# Patient Record
Sex: MALE | Race: WHITE | NOT HISPANIC OR LATINO | Employment: OTHER | URBAN - METROPOLITAN AREA
[De-identification: names, ages, dates, MRNs, and addresses within clinical notes are randomized per-mention and may not be internally consistent; named-entity substitution may affect disease eponyms.]

---

## 2023-09-01 ENCOUNTER — OFFICE VISIT (OUTPATIENT)
Dept: PRIMARY CARE | Facility: CLINIC | Age: 83
End: 2023-09-01
Payer: MEDICARE

## 2023-09-01 VITALS
SYSTOLIC BLOOD PRESSURE: 81 MMHG | BODY MASS INDEX: 28.59 KG/M2 | WEIGHT: 188 LBS | HEART RATE: 68 BPM | TEMPERATURE: 97.5 F | DIASTOLIC BLOOD PRESSURE: 44 MMHG

## 2023-09-01 DIAGNOSIS — F03.A0 MILD DEMENTIA WITHOUT BEHAVIORAL DISTURBANCE, PSYCHOTIC DISTURBANCE, MOOD DISTURBANCE, OR ANXIETY, UNSPECIFIED DEMENTIA TYPE (MULTI): ICD-10-CM

## 2023-09-01 DIAGNOSIS — K51.90 ULCERATIVE COLITIS WITHOUT COMPLICATIONS, UNSPECIFIED LOCATION (MULTI): ICD-10-CM

## 2023-09-01 DIAGNOSIS — L03.116 CELLULITIS OF LEFT LOWER EXTREMITY: ICD-10-CM

## 2023-09-01 DIAGNOSIS — Z00.00 MEDICARE ANNUAL WELLNESS VISIT, SUBSEQUENT: Primary | ICD-10-CM

## 2023-09-01 DIAGNOSIS — I73.9 PVD (PERIPHERAL VASCULAR DISEASE) (CMS-HCC): ICD-10-CM

## 2023-09-01 PROCEDURE — 1036F TOBACCO NON-USER: CPT | Performed by: FAMILY MEDICINE

## 2023-09-01 PROCEDURE — 1159F MED LIST DOCD IN RCRD: CPT | Performed by: FAMILY MEDICINE

## 2023-09-01 PROCEDURE — 1160F RVW MEDS BY RX/DR IN RCRD: CPT | Performed by: FAMILY MEDICINE

## 2023-09-01 PROCEDURE — 1170F FXNL STATUS ASSESSED: CPT | Performed by: FAMILY MEDICINE

## 2023-09-01 PROCEDURE — 3078F DIAST BP <80 MM HG: CPT | Performed by: FAMILY MEDICINE

## 2023-09-01 PROCEDURE — 3074F SYST BP LT 130 MM HG: CPT | Performed by: FAMILY MEDICINE

## 2023-09-01 PROCEDURE — 99213 OFFICE O/P EST LOW 20 MIN: CPT | Performed by: FAMILY MEDICINE

## 2023-09-01 RX ORDER — SIMVASTATIN 40 MG/1
40 TABLET, FILM COATED ORAL NIGHTLY
COMMUNITY
Start: 2012-12-07

## 2023-09-01 RX ORDER — CEPHALEXIN 500 MG/1
500 CAPSULE ORAL 2 TIMES DAILY
Qty: 20 CAPSULE | Refills: 0 | Status: SHIPPED | OUTPATIENT
Start: 2023-09-01 | End: 2023-09-11

## 2023-09-01 RX ORDER — LOSARTAN POTASSIUM 25 MG/1
25 TABLET ORAL DAILY
COMMUNITY
Start: 2021-12-10

## 2023-09-01 RX ORDER — ASPIRIN 81 MG/1
81 TABLET ORAL DAILY
COMMUNITY
Start: 2020-12-09

## 2023-09-01 RX ORDER — LEVOTHYROXINE SODIUM 100 UG/1
100 TABLET ORAL
COMMUNITY
Start: 2012-12-07

## 2023-09-01 RX ORDER — LEVOTHYROXINE SODIUM 88 UG/1
88 TABLET ORAL
COMMUNITY
Start: 2019-01-21

## 2023-09-01 RX ORDER — METOPROLOL TARTRATE 25 MG/1
25 TABLET, FILM COATED ORAL DAILY
COMMUNITY
Start: 2014-04-18

## 2023-09-01 RX ORDER — ACETAMINOPHEN 160 MG/5ML
1 SUSPENSION, ORAL (FINAL DOSE FORM) ORAL DAILY
COMMUNITY
Start: 2020-12-09

## 2023-09-01 RX ORDER — MUPIROCIN 20 MG/G
OINTMENT TOPICAL 3 TIMES DAILY
Qty: 22 G | Refills: 0 | Status: SHIPPED | OUTPATIENT
Start: 2023-09-01 | End: 2023-09-11

## 2023-09-01 RX ORDER — TAMSULOSIN HYDROCHLORIDE 0.4 MG/1
0.4 CAPSULE ORAL DAILY
COMMUNITY
Start: 2017-09-08 | End: 2024-01-30 | Stop reason: SDUPTHER

## 2023-09-01 ASSESSMENT — PATIENT HEALTH QUESTIONNAIRE - PHQ9
SUM OF ALL RESPONSES TO PHQ9 QUESTIONS 1 AND 2: 0
2. FEELING DOWN, DEPRESSED OR HOPELESS: NOT AT ALL
1. LITTLE INTEREST OR PLEASURE IN DOING THINGS: NOT AT ALL

## 2023-09-01 NOTE — PROGRESS NOTES
Subjective   Reason for Visit: Quentin Henson is an 82 y.o. male here for a Medicare Wellness visit.     Past Medical, Surgical, and Family History reviewed and updated in chart.    Reviewed all medications by prescribing practitioner or clinical pharmacist (such as prescriptions, OTCs, herbal therapies and supplements) and documented in the medical record.    Very pleasant 82-year-old with history of heart disease hyperlipidemia very mild dementia prediabetes and diet-controlled diabetes and peripheral vascular disease sees cardiologist regularly he is here for his annual Medicare wellness exam and for an infection in his leg  He had a small abrasion he remains very active in his woodworking sustained a small abrasion and has ulcer on his anterior leg  No fever or chills  Has been there for 2 weeks gradually getting worse  Was concerned it was infected did not seem to be healing well  No fever  Noticed increasing redness and warmth  Colonoscopy is up-to-date  Has history of ulcerative colitis which has been well controlled        Patient Self Assessment of Health Status  Patient Self Assessment: Good    Nutrition and Exercise  Current Diet: Well Balanced Diet  Adequate Fluid Intake: Yes  Caffeine: Yes  Exercise Frequency: Regularly    Functional Ability/Level of Safety  Cognitive Impairment Observed: No cognitive impairment observed  Cognitive Impairment Reported: Cognitive impairment reported by patient or family    Home Safety Risk Factors: None         Patient Care Team:  Rachel Woodard MD as PCP - General  Rachel Woodard MD as PCP - [employer] PCP     Review of Systems  Constitutional: no chills, no fever and no night sweats.   Eyes: no blurred vision and no eyesight problems.   ENT: no hearing loss, no nasal congestion, no nasal discharge, no hoarseness and no sore throat.   Cardiovascular: no chest pain, no intermittent leg claudication, no lower extremity edema, no palpitations and no  syncope.   Respiratory: no cough, no shortness of breath during exertion, no shortness of breath at rest and no wheezing.   Gastrointestinal: no abdominal pain, no blood in stools, no constipation, no diarrhea, no melena, no nausea, no rectal pain and no vomiting.   Genitourinary: no dysuria, no change in urinary frequency, no urinary hesitancy, no feelings of urinary urgency and no vaginal discharge.   Musculoskeletal: no arthralgias,  no back pain and no myalgias.   Integumentary: no new skin lesions and no rashes.   Neurological: no difficulty walking, no headache, no limb weakness, no numbness and no tingling.   Psychiatric: no anxiety, no depression, no anhedonia and no substance use disorders.   Endocrine: no recent weight gain and no recent weight loss.   Hematologic/Lymphatic: no tendency for easy bruising and no swollen glands .      Follow-up with  Drake thank you for letting me know  Objective     Vitals:  BP (!) 81/44   Pulse 68   Temp 36.4 °C (97.5 °F)   Wt 85.3 kg (188 lb)   BMI 28.59 kg/m²       Physical Exam  The patient appeared well nourished and normally developed. Vital signs as documented. Head exam is unremarkable. No scleral icterus or corneal arcus noted.  Pupils are equal round reactive to light extraocular movements are intact no hemorrhages noted on funduscopic exam mouth mucous membranes are moist no exudates ears canals clear TMs are gray pearly not injected nose no rhinorrhea or epistaxis Neck is without jugular venous distension, thyromegaly, or carotid bruits. Carotid upstrokes are brisk bilaterally. Lungs are clear to auscultation and percussion. Cardiac exam reveals the PMI to be normally sized and situated. Rhythm is regular. First and second heart sounds normal. No murmurs, rubs or gallops. Abdominal exam reveals normal bowel sounds, no masses, no organomegaly and no aortic enlargement. Extremities are nonedematous and both femoral and pedal pulses are normal.  Neurologic exam  DTRs are equal bilaterally no focal deficits strength is symmetrical heme lymph no palpable lymph nodes in the neck axilla or groin  Degenerative changes in the joints  Right lower extremity erythematous indurated area on the anterior tibia surrounding a small abrasion no corresponding lymphadenopathy no lymphangitic spread no streaking  Assessment/Plan   Problem List Items Addressed This Visit       Medicare annual wellness visit, subsequent - Primary    Current Assessment & Plan     Continue annual exams         Cellulitis of left lower extremity    Current Assessment & Plan     Oral cephalexin  Apply topical Bactroban  Reassess in 48 hours         PVD (peripheral vascular disease) (CMS/[facility])    Current Assessment & Plan     Stable based on symptoms and exam  Continue to monitor         Ulcerative colitis (CMS/[facility])    Current Assessment & Plan     Stable and controlled  Continue to follow with gastroenterologist         Mild dementia (CMS/[facility])    Current Assessment & Plan     Very mild short-term memory loss  Stable remains very active  Continue to monitor

## 2023-09-16 PROBLEM — K51.90 ULCERATIVE COLITIS (MULTI): Status: ACTIVE | Noted: 2017-04-04

## 2023-09-16 PROBLEM — I21.19 ACUTE INFERIOR MYOCARDIAL INFARCTION (MULTI): Status: ACTIVE | Noted: 2023-09-16

## 2023-09-16 PROBLEM — E55.9 MILD VITAMIN D DEFICIENCY: Status: ACTIVE | Noted: 2023-07-12

## 2023-09-16 PROBLEM — E78.5 HYPERLIPIDEMIA: Status: ACTIVE | Noted: 2023-09-16

## 2023-09-16 PROBLEM — Z00.00 MEDICARE ANNUAL WELLNESS VISIT, SUBSEQUENT: Status: ACTIVE | Noted: 2023-09-16

## 2023-09-16 PROBLEM — L03.116 CELLULITIS OF LEFT LOWER EXTREMITY: Status: ACTIVE | Noted: 2023-09-16

## 2023-09-16 PROBLEM — M21.079 VALGUS FOOT: Status: ACTIVE | Noted: 2023-09-16

## 2023-09-16 PROBLEM — I73.9 PVD (PERIPHERAL VASCULAR DISEASE) (CMS-HCC): Status: ACTIVE | Noted: 2023-09-16

## 2023-09-16 PROBLEM — K57.30 DIVERTICULOSIS OF LARGE INTESTINE WITHOUT HEMORRHAGE: Status: RESOLVED | Noted: 2017-04-04 | Resolved: 2023-09-16

## 2023-09-16 PROBLEM — E89.0 HYPOTHYROIDISM, POSTOP: Status: ACTIVE | Noted: 2023-07-12

## 2023-09-16 PROBLEM — R93.89 ABNORMAL CHEST XRAY: Status: ACTIVE | Noted: 2023-09-16

## 2023-09-16 PROBLEM — H26.9 BILATERAL CATARACTS: Status: RESOLVED | Noted: 2023-09-16 | Resolved: 2023-09-16

## 2023-09-16 PROBLEM — R41.82 ALTERED MENTAL STATUS: Status: RESOLVED | Noted: 2023-09-16 | Resolved: 2023-09-16

## 2023-09-16 PROBLEM — M19.90 OSTEOARTHRITIS: Status: ACTIVE | Noted: 2023-09-16

## 2023-09-16 PROBLEM — M48.061 SPINAL STENOSIS, LUMBAR REGION WITHOUT NEUROGENIC CLAUDICATION: Status: ACTIVE | Noted: 2023-09-16

## 2023-09-16 PROBLEM — I21.19 ACUTE INFERIOR MYOCARDIAL INFARCTION (MULTI): Status: RESOLVED | Noted: 2023-09-16 | Resolved: 2023-09-16

## 2023-09-16 PROBLEM — R68.89 FLU-LIKE SYMPTOMS: Status: RESOLVED | Noted: 2023-09-16 | Resolved: 2023-09-16

## 2023-09-16 PROBLEM — F03.A0 MILD DEMENTIA (MULTI): Status: ACTIVE | Noted: 2023-09-16

## 2023-09-16 PROBLEM — R31.21 ASYMPTOMATIC MICROSCOPIC HEMATURIA: Status: RESOLVED | Noted: 2023-09-16 | Resolved: 2023-09-16

## 2023-09-16 PROBLEM — M19.079 OSTEOARTHRITIS OF FOOT: Status: RESOLVED | Noted: 2023-09-16 | Resolved: 2023-09-16

## 2023-09-16 PROBLEM — I10 HYPERTENSION: Status: ACTIVE | Noted: 2023-09-16

## 2023-09-16 PROBLEM — M51.26 LUMBAR DISC HERNIATION: Status: RESOLVED | Noted: 2023-09-16 | Resolved: 2023-09-16

## 2023-09-16 PROBLEM — B35.1 DERMATOPHYTOSIS, NAIL: Status: RESOLVED | Noted: 2023-09-16 | Resolved: 2023-09-16

## 2023-09-16 PROBLEM — R73.03 PREDIABETES: Status: ACTIVE | Noted: 2023-09-16

## 2023-09-16 PROBLEM — E89.0 HYPOTHYROIDISM, POSTOP: Status: RESOLVED | Noted: 2023-07-12 | Resolved: 2023-09-16

## 2023-09-16 PROBLEM — I51.9 HEART DISEASE: Status: ACTIVE | Noted: 2023-09-16

## 2023-09-16 PROBLEM — K31.1 PYLORIC STENOSIS (HHS-HCC): Status: ACTIVE | Noted: 2023-09-16

## 2023-09-16 ASSESSMENT — PATIENT HEALTH QUESTIONNAIRE - PHQ9
2. FEELING DOWN, DEPRESSED OR HOPELESS: NOT AT ALL
SUM OF ALL RESPONSES TO PHQ9 QUESTIONS 1 AND 2: 0
1. LITTLE INTEREST OR PLEASURE IN DOING THINGS: NOT AT ALL

## 2023-09-16 ASSESSMENT — ACTIVITIES OF DAILY LIVING (ADL)
DRESSING: INDEPENDENT
TAKING_MEDICATION: INDEPENDENT
DOING_HOUSEWORK: INDEPENDENT
MANAGING_FINANCES: INDEPENDENT
GROCERY_SHOPPING: INDEPENDENT
BATHING: INDEPENDENT

## 2023-09-17 NOTE — PATIENT INSTRUCTIONS
You have an infection in your leg  I prescribed an EpiPen antibiotic both topical and oral  Follow-up if your symptoms do not improve and watch for signs of worsening infection  You also had your annual wellness exam today  Continue to follow with your cardiologist  Please call if you need anything and continue annual exams

## 2023-11-01 ENCOUNTER — OFFICE VISIT (OUTPATIENT)
Dept: PRIMARY CARE | Facility: CLINIC | Age: 83
End: 2023-11-01
Payer: MEDICARE

## 2023-11-01 VITALS
TEMPERATURE: 96.9 F | BODY MASS INDEX: 29.01 KG/M2 | DIASTOLIC BLOOD PRESSURE: 68 MMHG | SYSTOLIC BLOOD PRESSURE: 128 MMHG | HEIGHT: 68 IN | WEIGHT: 191.4 LBS | HEART RATE: 53 BPM

## 2023-11-01 DIAGNOSIS — R41.3 MEMORY LOSS: ICD-10-CM

## 2023-11-01 DIAGNOSIS — F03.A0 MILD DEMENTIA WITHOUT BEHAVIORAL DISTURBANCE, PSYCHOTIC DISTURBANCE, MOOD DISTURBANCE, OR ANXIETY, UNSPECIFIED DEMENTIA TYPE (MULTI): Primary | ICD-10-CM

## 2023-11-01 DIAGNOSIS — R41.82 ALTERED MENTAL STATUS, UNSPECIFIED ALTERED MENTAL STATUS TYPE: ICD-10-CM

## 2023-11-01 DIAGNOSIS — R73.03 PREDIABETES: ICD-10-CM

## 2023-11-01 DIAGNOSIS — E03.8 SECONDARY HYPOTHYROIDISM: ICD-10-CM

## 2023-11-01 DIAGNOSIS — E78.2 MIXED HYPERLIPIDEMIA: ICD-10-CM

## 2023-11-01 DIAGNOSIS — E55.9 MILD VITAMIN D DEFICIENCY: ICD-10-CM

## 2023-11-01 DIAGNOSIS — I10 PRIMARY HYPERTENSION: ICD-10-CM

## 2023-11-01 PROBLEM — G54.0 BRACHIAL PLEXUS NEUROPATHY: Status: RESOLVED | Noted: 2020-02-01 | Resolved: 2023-11-01

## 2023-11-01 LAB
NON-UH HIE A/G RATIO: 1.3
NON-UH HIE ALB: 4 G/DL (ref 3.4–5)
NON-UH HIE ALK PHOS: 69 UNIT/L (ref 45–117)
NON-UH HIE BILIRUBIN, TOTAL: 0.6 MG/DL (ref 0.3–1.2)
NON-UH HIE BUN/CREAT RATIO: 25.5
NON-UH HIE BUN: 28 MG/DL (ref 9–23)
NON-UH HIE CALCIUM: 9.5 MG/DL (ref 8.7–10.4)
NON-UH HIE CALCULATED LDL CHOLESTEROL: 69 MG/DL (ref 60–130)
NON-UH HIE CALCULATED OSMOLALITY: 288 MOSM/KG (ref 275–295)
NON-UH HIE CHLORIDE: 105 MMOL/L (ref 98–107)
NON-UH HIE CHOLESTEROL: 140 MG/DL (ref 100–200)
NON-UH HIE CO2, VENOUS: 29 MMOL/L (ref 20–31)
NON-UH HIE CREATININE: 1.1 MG/DL (ref 0.6–1.1)
NON-UH HIE GFR AA: >60
NON-UH HIE GLOBULIN: 3 G/DL
NON-UH HIE GLOMERULAR FILTRATION RATE: >60 ML/MIN/1.73M?
NON-UH HIE GLUCOSE: 126 MG/DL (ref 74–106)
NON-UH HIE GOT: 24 UNIT/L (ref 15–37)
NON-UH HIE GPT: 30 UNIT/L (ref 10–49)
NON-UH HIE HDL CHOLESTEROL: 60 MG/DL (ref 40–60)
NON-UH HIE HGB A1C: 6 %
NON-UH HIE K: 4.4 MMOL/L (ref 3.5–5.1)
NON-UH HIE NA: 141 MMOL/L (ref 135–145)
NON-UH HIE TOTAL CHOL/HDL CHOL RATIO: 2.3
NON-UH HIE TOTAL PROTEIN: 7 G/DL (ref 5.7–8.2)
NON-UH HIE TRIGLYCERIDES: 57 MG/DL (ref 30–150)
NON-UH HIE TSH: 7.62 UIU/ML (ref 0.55–4.78)
NON-UH HIE VIT D 25: 54 NG/ML
NON-UH HIE VITAMIN B12: 362 PG/ML (ref 211–911)

## 2023-11-01 PROCEDURE — 1159F MED LIST DOCD IN RCRD: CPT | Performed by: FAMILY MEDICINE

## 2023-11-01 PROCEDURE — 3078F DIAST BP <80 MM HG: CPT | Performed by: FAMILY MEDICINE

## 2023-11-01 PROCEDURE — 1036F TOBACCO NON-USER: CPT | Performed by: FAMILY MEDICINE

## 2023-11-01 PROCEDURE — 1160F RVW MEDS BY RX/DR IN RCRD: CPT | Performed by: FAMILY MEDICINE

## 2023-11-01 PROCEDURE — 3074F SYST BP LT 130 MM HG: CPT | Performed by: FAMILY MEDICINE

## 2023-11-01 PROCEDURE — 99214 OFFICE O/P EST MOD 30 MIN: CPT | Performed by: FAMILY MEDICINE

## 2023-11-01 NOTE — PROGRESS NOTES
Subjective   Patient ID: Quentin Henson is a 83 y.o. male who presents for Follow-up (Wants to discuss having another CT scan. Defers flu shot today. ).  Very pleasant 83-year-old with history of hypertension and hyperlipidemia sees cardiology regularly  Also has history of prostate disease sees urologist  He had a recent episode of cellulitis in the lower extremity and that got better he is here today for follow-up  Extensive discussion with patient and his wife reports that he has has memory issues he does not notice that himself has not until fairly recently he occasionally gets forgetful he does not get lost he drives he has been eating healthy he does not get lost when he drives he is very routine oriented and is staying he thinks that his wife focuses on that too much and his habits and he is frequently quiet and does not feel that his mental status is all that bad she is adamant that he has dementia he is now concerned because he is getting occasionally forgetful not remembering where he placed his keys and things like that but does not get lost when he drives he remembers how to use appliances at home and is able to balance his checkbook he does not report any depression he is concerned because his brother is going into a nursing home for memory issues and his sister is in a nursing home as well for severe memory loss  He otherwise feels well he keeps up with doctors appointments sees all his specialists he is due for his blood work he has a history of prediabetes for diet-controlled type 2 diabetes he is felt well no hypo or her hyperglycemia no ketoacidosis        Review of Systems  Constitutional: no chills, no fever and no night sweats.   Eyes: no blurred vision and no eyesight problems.   ENT: no hearing loss, no nasal congestion, no nasal discharge, no hoarseness and no sore throat.   Cardiovascular: no chest pain, no intermittent leg claudication, no lower extremity edema, no palpitations and no  "syncope.   Respiratory: no cough, no shortness of breath during exertion, no shortness of breath at rest and no wheezing.   Gastrointestinal: no abdominal pain, no blood in stools, no constipation, no diarrhea, no melena, no nausea, no rectal pain and no vomiting.   Genitourinary: no dysuria, no change in urinary frequency, no urinary hesitancy, no feelings of urinary urgency and no vaginal discharge.   Musculoskeletal: no arthralgias,  no back pain and no myalgias.   Integumentary: no new skin lesions and no rashes.   Neurological: no difficulty walking, no headache, no limb weakness, no numbness and no tingling.   Psychiatric: no anxiety, no depression, no anhedonia and no substance use disorders.   Endocrine: no recent weight gain and no recent weight loss.   Hematologic/Lymphatic: no tendency for easy bruising and no swollen glands .    Objective    /68   Pulse 53   Temp 36.1 °C (96.9 °F)   Ht 1.727 m (5' 8\")   Wt 86.8 kg (191 lb 6.4 oz)   BMI 29.10 kg/m²    Physical Exam  The patient appeared well nourished and normally developed. Vital signs as documented. Head exam is unremarkable. No scleral icterus or corneal arcus noted.  Pupils are equal round reactive to light extraocular movements are intact no hemorrhages noted on funduscopic exam mouth mucous membranes are moist no exudates ears canals clear TMs are gray pearly not injected nose no rhinorrhea or epistaxis Neck is without jugular venous distension, thyromegaly, or carotid bruits. Carotid upstrokes are brisk bilaterally. Lungs are clear to auscultation and percussion. Cardiac exam reveals the PMI to be normally sized and situated. Rhythm is regular. First and second heart sounds normal. No murmurs, rubs or gallops. Abdominal exam reveals normal bowel sounds, no masses, no organomegaly and no aortic enlargement. Extremities are nonedematous and both femoral and pedal pulses are normal.  Neurologic exam DTRs are equal bilaterally no focal " deficits strength is symmetrical heme lymph no palpable lymph nodes in the neck axilla or groin degenerative changes in the joints alert no acute distress  Assessment/Plan   Problem List Items Addressed This Visit       Altered mental status     Patient notes worsening mental function particularly short-term memory  Consult neuropsychology  CT of the head         Relevant Orders    CT head wo IV contrast    Hypertension    Relevant Orders    Comprehensive Metabolic Panel    Hyperlipidemia    Relevant Orders    Lipid Panel    Mild vitamin D deficiency    Relevant Orders    Vitamin D 25-Hydroxy,Total (for eval of Vitamin D levels)    Secondary hypothyroidism    Relevant Orders    TSH    Mild dementia (CMS/HCC) - Primary    Relevant Orders    Vitamin B12    Prediabetes    Relevant Orders    Hemoglobin A1C     Other Visit Diagnoses       Memory loss                     Rachel Woodard MD

## 2023-11-13 NOTE — RESULT ENCOUNTER NOTE
Call patient  His thyroid is underactive  I believe he has an endocrinologist he may want to follow-up  His blood sugar is high as well his vitamin levels are normal and his cholesterol levels are great

## 2023-11-18 PROBLEM — L03.116 CELLULITIS OF LEFT LOWER EXTREMITY: Status: RESOLVED | Noted: 2023-09-16 | Resolved: 2023-11-18

## 2023-11-18 PROBLEM — R41.82 ALTERED MENTAL STATUS: Status: ACTIVE | Noted: 2023-09-16

## 2023-11-19 NOTE — ASSESSMENT & PLAN NOTE
Patient notes worsening mental function particularly short-term memory  Consult neuropsychology  CT of the head

## 2023-12-26 ENCOUNTER — TELEPHONE (OUTPATIENT)
Dept: PRIMARY CARE | Facility: CLINIC | Age: 83
End: 2023-12-26
Payer: MEDICARE

## 2024-01-29 ENCOUNTER — TELEPHONE (OUTPATIENT)
Dept: PRIMARY CARE | Facility: CLINIC | Age: 84
End: 2024-01-29
Payer: MEDICARE

## 2024-01-30 DIAGNOSIS — N40.0 BENIGN PROSTATIC HYPERPLASIA, UNSPECIFIED WHETHER LOWER URINARY TRACT SYMPTOMS PRESENT: Primary | ICD-10-CM

## 2024-01-30 RX ORDER — TAMSULOSIN HYDROCHLORIDE 0.4 MG/1
0.4 CAPSULE ORAL DAILY
Qty: 30 CAPSULE | Refills: 2 | Status: SHIPPED | OUTPATIENT
Start: 2024-01-30

## 2024-02-09 ENCOUNTER — OFFICE VISIT (OUTPATIENT)
Dept: PAIN MEDICINE | Facility: CLINIC | Age: 84
End: 2024-02-09
Payer: MEDICARE

## 2024-02-09 VITALS
SYSTOLIC BLOOD PRESSURE: 132 MMHG | HEIGHT: 68 IN | HEART RATE: 58 BPM | WEIGHT: 191 LBS | BODY MASS INDEX: 28.95 KG/M2 | DIASTOLIC BLOOD PRESSURE: 80 MMHG | RESPIRATION RATE: 16 BRPM | TEMPERATURE: 97.7 F

## 2024-02-09 DIAGNOSIS — G89.29 CHRONIC RIGHT-SIDED LOW BACK PAIN WITH RIGHT-SIDED SCIATICA: ICD-10-CM

## 2024-02-09 DIAGNOSIS — M51.26 LUMBAR DISC HERNIATION: Primary | ICD-10-CM

## 2024-02-09 DIAGNOSIS — M54.41 CHRONIC RIGHT-SIDED LOW BACK PAIN WITH RIGHT-SIDED SCIATICA: ICD-10-CM

## 2024-02-09 DIAGNOSIS — M54.16 LUMBAR NEURITIS: ICD-10-CM

## 2024-02-09 DIAGNOSIS — M48.061 LUMBAR FORAMINAL STENOSIS: ICD-10-CM

## 2024-02-09 PROCEDURE — 99214 OFFICE O/P EST MOD 30 MIN: CPT | Performed by: ANESTHESIOLOGY

## 2024-02-09 SDOH — ECONOMIC STABILITY: FOOD INSECURITY: WITHIN THE PAST 12 MONTHS, YOU WORRIED THAT YOUR FOOD WOULD RUN OUT BEFORE YOU GOT MONEY TO BUY MORE.: NEVER TRUE

## 2024-02-09 SDOH — ECONOMIC STABILITY: FOOD INSECURITY: WITHIN THE PAST 12 MONTHS, THE FOOD YOU BOUGHT JUST DIDN'T LAST AND YOU DIDN'T HAVE MONEY TO GET MORE.: NEVER TRUE

## 2024-02-09 ASSESSMENT — ENCOUNTER SYMPTOMS
LOSS OF SENSATION IN FEET: 0
OCCASIONAL FEELINGS OF UNSTEADINESS: 0
DEPRESSION: 0

## 2024-02-09 ASSESSMENT — LIFESTYLE VARIABLES
HOW OFTEN DO YOU HAVE A DRINK CONTAINING ALCOHOL: NEVER
AUDIT-C TOTAL SCORE: 0
SKIP TO QUESTIONS 9-10: 1
HOW OFTEN DO YOU HAVE SIX OR MORE DRINKS ON ONE OCCASION: NEVER
HOW MANY STANDARD DRINKS CONTAINING ALCOHOL DO YOU HAVE ON A TYPICAL DAY: PATIENT DOES NOT DRINK

## 2024-02-09 ASSESSMENT — COLUMBIA-SUICIDE SEVERITY RATING SCALE - C-SSRS
1. IN THE PAST MONTH, HAVE YOU WISHED YOU WERE DEAD OR WISHED YOU COULD GO TO SLEEP AND NOT WAKE UP?: NO
6. HAVE YOU EVER DONE ANYTHING, STARTED TO DO ANYTHING, OR PREPARED TO DO ANYTHING TO END YOUR LIFE?: NO
2. HAVE YOU ACTUALLY HAD ANY THOUGHTS OF KILLING YOURSELF?: NO

## 2024-02-09 ASSESSMENT — PAIN SCALES - GENERAL: PAINLEVEL: 1

## 2024-02-09 NOTE — PROGRESS NOTES
History Of Present Illness  Quentin Henson is a 83 y.o. male presenting with   Chief Complaint   Patient presents with    Back Pain     [facility]     Patient returns after 2 years regarding his chronic low back pain to the RLE posteriorly and into his foot. The pain is constant, worse in the morning and better with activity. The pain is sharp, stabbing and shooting to the RLE. Denies LE paresthesias, weakness, saddle anesthesia, bowel or bladder incontinence. To manage this pain the patient has attempted Tylenol and 20% relief. The patient has undergone epidurals at [place] with Dr. Maria and he reported greater than 50-75% relief of his pain lasting for several years. The patients chronic Hypothyroidism, DLD, CAD 2- for an MI and is on ASA 81mg and has been stable.     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     Cards: Dr. Lugo ()   Ortho: Dr. Guerra   Prev Pain: Dr. 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), Dermatophytosis, nail (09/16/2023), Diverticulosis of large intestine without hemorrhage (04/04/2017), Flu-like symptoms (09/16/2023), Influenza due to other identified influenza virus with other respiratory manifestations (01/03/2018), Lumbar disc herniation (09/16/2023), Osteoarthritis of foot (09/16/2023), Personal history of diseases of the skin and subcutaneous tissue (06/13/2016), Personal history of other diseases of the circulatory system (04/16/2014), Personal history of other diseases of the respiratory system (08/14/2017), and Personal history of other diseases of urinary system  (07/22/2019).    Surgical History  He has a past surgical history that includes Knee Arthroplasty (04/16/2014); Other surgical history (08/19/2021); Other surgical history (08/19/2021); Other surgical history (08/19/2021); Other surgical history (08/19/2021); Other surgical history (08/19/2021); and Other surgical history (08/19/2021).     Social History  He reports that he has never smoked. He has never used smokeless tobacco. He reports current alcohol use. He reports that he does not use drugs.    Family History  Family History   Problem Relation Name Age of Onset    Heart disease Father      Dementia Sister      Cancer Brother          Allergies  Lisinopril and Sulfa (sulfonamide antibiotics)    Review of Systems    All other systems reviewed and negative for any deficits. Pertinent positives and negatives were considered in the medical decision making process.        Physical Exam  /80   Pulse 58   Temp 36.5 °C (97.7 °F)   Resp 16   Wt 86.6 kg (191 lb)     RECALL FROM LAST VISIT      General: Pt appears stated age     Eyes: Conjunctiva non-icteric and lids without obvious rash or drooping. Pupils are symmetric     ENT: External ears are without deformity or rash. Hearing is grossly intact     Skin: No rashes or open lesions/ulcers identified on skin.     Musculoskeletal: Gait is grossly antalgic      Digits/nails show no clubbing or cyanosis     Exam of muscles/joints/bones shows no gross atrophy and no abnormal/involuntary movements in the head/neckNo asymmetry or masses noted in the head/neck     Stability: no subluxation noted on movement of bilateral upper extremities or head/neck     Strength: 4/5 in RLE and 5/5 in LLE     Range of Motion: WNL      Sensation: In tact      Cranial nerves 2-12 are grossly intact     Psychiatric: Pt is alert and oriented to time, place and person.    Assessment/Plan   No diagnosis found.      Diagnoses/Problems   · Chronic low back pain (724.2,338.29)  "(M54.50,G89.29)   · Lumbar disc herniation (722.10) (M51.26)   · Lumbar foraminal stenosis (724.02) (M48.061)   · Lumbar stenosis (724.02) (M48.061)     Provider Impressions        1. I have provided the patient with a list of physical therapy exercises to learn and perform to strengthen core.     Pt reports he has been working on his core strength regularly including sit ups.      2. I did previously discussed started on Gabapentin to help with nerve related pain. We discussed the risks, benefits, and side effects to this medication including the mechanism of action and the pt understands and would like to hold off for now.      \"Im not big on pain pills\"      3. I previously reviewed the patients MRI (7-2019)findings in detail, including review of the actual images and provided a detailed explanation of the findings using a spine model.      \"Advanced disc height loss L2-3 and L3-4. Broad-based posterior disc  osteophyte and bulky facet arthropathy throughout. Mild central canal stenosis  L1-2. Moderate central canal stenosis L2-3. Moderate to severe central canal  stenosis L3-4 and L4-5. Moderate diffuse neuroforaminal stenosis. The conus  terminates at L1 and is normal. \"     bcsc  4. The patient is a candidate for an LESI at L5/S1 versus Caudal to treat back and radicular pain. I spent time with the patient discussing all of the risks, benefits, and alternatives to this measure. Including but not limited to spinal infection, epidural hematoma/abscess, paralysis, nerve injury, steroid effects, and spinal headache. The patient understands and agrees to proceed.     His last injection was in Nov 2021 with myself and prior to that  2019 with Dr. Maria and he reported greater than 50% relief of his pain lasting for 2 years time each time. His procedure was done with 80mg of Depo-Medrol and 0.5% Lidocaine. He reports he was able to stand and get up the stairs with much less pain, however, since it has worn off and " going up the stairs is more painful for him.      We will contact the patients PCP to determine if it is safe to stop ASA 7 days prior to procedure. If Pt is to be bridged on Lovenox they will need to be off of Lovenox for 24 hours prior to the procedure. They will also need to have their INR checked the day of the procedure. We did discuss the risks for stopping anticoagulation including, but not limited to any cardiovascular event, embolism, and even death. The patient understands these risks and would like to proceed with the procedure.       FU in 3 months     I spent time with the patient reviewing their imaging and discussing the risks benefits and alternatives to the above plan. A total of 20 minutes was spent reviewing the data and greater than 50% of that time was with the patient during the encounter discussing treatment options both surgical, non-surgical, and minimally invasive techniques.       Melvin Corona MD

## 2024-04-04 ENCOUNTER — OFFICE VISIT (OUTPATIENT)
Dept: PAIN MEDICINE | Facility: CLINIC | Age: 84
End: 2024-04-04
Payer: MEDICARE

## 2024-04-04 VITALS
OXYGEN SATURATION: 98 % | TEMPERATURE: 97.9 F | DIASTOLIC BLOOD PRESSURE: 96 MMHG | SYSTOLIC BLOOD PRESSURE: 149 MMHG | BODY MASS INDEX: 29.04 KG/M2 | HEART RATE: 58 BPM | WEIGHT: 191 LBS | RESPIRATION RATE: 15 BRPM

## 2024-04-04 DIAGNOSIS — M51.26 LUMBAR DISC HERNIATION: ICD-10-CM

## 2024-04-04 DIAGNOSIS — M48.061 LUMBAR FORAMINAL STENOSIS: ICD-10-CM

## 2024-04-04 DIAGNOSIS — G89.29 CHRONIC RIGHT-SIDED LOW BACK PAIN WITH RIGHT-SIDED SCIATICA: ICD-10-CM

## 2024-04-04 DIAGNOSIS — M54.41 CHRONIC RIGHT-SIDED LOW BACK PAIN WITH RIGHT-SIDED SCIATICA: ICD-10-CM

## 2024-04-04 DIAGNOSIS — M48.061 SPINAL STENOSIS, LUMBAR REGION WITHOUT NEUROGENIC CLAUDICATION: Primary | ICD-10-CM

## 2024-04-04 PROCEDURE — 99214 OFFICE O/P EST MOD 30 MIN: CPT | Performed by: ANESTHESIOLOGY

## 2024-04-04 ASSESSMENT — PAIN SCALES - GENERAL
PAINLEVEL: 1
PAINLEVEL_OUTOF10: 1

## 2024-04-04 ASSESSMENT — ENCOUNTER SYMPTOMS
LOSS OF SENSATION IN FEET: 0
OCCASIONAL FEELINGS OF UNSTEADINESS: 0

## 2024-04-04 ASSESSMENT — PAIN - FUNCTIONAL ASSESSMENT: PAIN_FUNCTIONAL_ASSESSMENT: 0-10

## 2024-04-04 ASSESSMENT — PAIN DESCRIPTION - DESCRIPTORS: DESCRIPTORS: ACHING

## 2024-04-04 NOTE — PROGRESS NOTES
History Of Present Illness  Quentin Henson is a 83 y.o. male presenting with   Chief Complaint   Patient presents with    Follow-up     [facility] [place]     Patient returns regarding his chronic low back pain to the RLE posteriorly and into his foot. The pain is constant, worse in the morning and better with activity. The pain is sharp, stabbing and shooting to the RLE. Denies LE paresthesias, weakness, saddle anesthesia, bowel or bladder incontinence. To manage this pain the patient has attempted Tylenol and 20% relief. The patient has undergone epidurals at [facility] with Dr. Maria and he reported greater than 50-75% relief of his pain lasting for several years. The patients chronic Hypothyroidism, DLD, CAD 2- for an MI and is on ASA 81mg and has been stable.     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 Lugo ()   Ortho: Dr. Guerra   Prev Pain: Dr. 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), Dermatophytosis, nail (09/16/2023), Diverticulosis of large intestine without hemorrhage (04/04/2017), Flu-like symptoms (09/16/2023), Influenza due to other identified influenza virus with other respiratory manifestations (01/03/2018), Lumbar disc herniation (09/16/2023), Osteoarthritis of foot (09/16/2023), Personal history of diseases of the skin and subcutaneous tissue (06/13/2016), Personal history of other diseases of the circulatory system (04/16/2014), Personal history of other diseases of the respiratory system (08/14/2017), and Personal history of other diseases of  urinary system (07/22/2019).    Surgical History  He has a past surgical history that includes Knee Arthroplasty (04/16/2014); Other surgical history (08/19/2021); Other surgical history (08/19/2021); Other surgical history (08/19/2021); Other surgical history (08/19/2021); Other surgical history (08/19/2021); and Other surgical history (08/19/2021).     Social History  He reports that he has never smoked. He has never used smokeless tobacco. He reports current alcohol use. He reports that he does not use drugs.    Family History  Family History   Problem Relation Name Age of Onset    Heart disease Father      Dementia Sister      Cancer Brother          Allergies  Lisinopril and Sulfa (sulfonamide antibiotics)    Review of Systems    All other systems reviewed and negative for any deficits. Pertinent positives and negatives were considered in the medical decision making process.        Physical Exam  BP (!) 149/96   Pulse 58   Temp 36.6 °C (97.9 °F)   Resp 15   Wt 86.6 kg (191 lb)   SpO2 98%     RECALL FROM LAST VISIT      General: Pt appears stated age     Eyes: Conjunctiva non-icteric and lids without obvious rash or drooping. Pupils are symmetric     ENT: External ears are without deformity or rash. Hearing is grossly intact     Skin: No rashes or open lesions/ulcers identified on skin.     Musculoskeletal: Gait is grossly antalgic      Digits/nails show no clubbing or cyanosis     Exam of muscles/joints/bones shows no gross atrophy and no abnormal/involuntary movements in the head/neckNo asymmetry or masses noted in the head/neck     Stability: no subluxation noted on movement of bilateral upper extremities or head/neck     Strength: 4/5 in RLE and 5/5 in LLE     Range of Motion: WNL      Sensation: In tact      Cranial nerves 2-12 are grossly intact     Psychiatric: Pt is alert and oriented to time, place and person.    Assessment/Plan   1. Spinal stenosis, lumbar region without neurogenic claudication    "     2. Lumbar disc herniation        3. Lumbar foraminal stenosis        4. Chronic right-sided low back pain with right-sided sciatica              Diagnoses/Problems   · Chronic low back pain (724.2,338.29) (M54.50,G89.29)   · Lumbar disc herniation (722.10) (M51.26)   · Lumbar foraminal stenosis (724.02) (M48.061)   · Lumbar stenosis (724.02) (M48.061)     Provider Impressions        1. I have provided the patient with a list of physical therapy exercises to learn and perform to strengthen core.     Pt reports he has been working on his core strength regularly including sit ups.      2. I did previously discussed started on Gabapentin to help with nerve related pain. We discussed the risks, benefits, and side effects to this medication including the mechanism of action and the pt understands and would like to hold off for now.      \"Im not big on pain pills\"      3. I previously reviewed the patients MRI (7-2019)findings in detail, including review of the actual images and provided a detailed explanation of the findings using a spine model.      \"Advanced disc height loss L2-3 and L3-4. Broad-based posterior disc  osteophyte and bulky facet arthropathy throughout. Mild central canal stenosis  L1-2. Moderate central canal stenosis L2-3. Moderate to severe central canal  stenosis L3-4 and L4-5. Moderate diffuse neuroforaminal stenosis. The conus  terminates at L1 and is normal. \"     bcsc  4. The patient is a candidate for an LESI at L5/S1 versus Caudal to treat back and radicular pain. I spent time with the patient discussing all of the risks, benefits, and alternatives to this measure. Including but not limited to spinal infection, epidural hematoma/abscess, paralysis, nerve injury, steroid effects, and spinal headache. The patient understands and agrees to proceed.     His last injection was on 2- with 80% relief of his pain that is ongoing. Prior to that Nov 2021 with myself and prior to that  2019 with " Dr. Maria and he reported greater than 50% relief of his pain lasting for 2 years time each time. His procedure was done with 80mg of Depo-Medrol and 0.5% Lidocaine. He reports he was able to stand and get up the stairs with much less pain, however, since it has worn off and going up the stairs is more painful for him.      We will contact the patients PCP to determine if it is safe to stop ASA 7 days prior to procedure. If Pt is to be bridged on Lovenox they will need to be off of Lovenox for 24 hours prior to the procedure. They will also need to have their INR checked the day of the procedure. We did discuss the risks for stopping anticoagulation including, but not limited to any cardiovascular event, embolism, and even death. The patient understands these risks and would like to proceed with the procedure.       FU in 3 months     I spent time with the patient reviewing their imaging and discussing the risks benefits and alternatives to the above plan. A total of 20 minutes was spent reviewing the data and greater than 50% of that time was with the patient during the encounter discussing treatment options both surgical, non-surgical, and minimally invasive techniques.       Melvin Corona MD

## 2024-08-01 ENCOUNTER — TELEPHONE (OUTPATIENT)
Dept: PRIMARY CARE | Facility: CLINIC | Age: 84
End: 2024-08-01
Payer: MEDICARE

## 2024-08-01 DIAGNOSIS — N40.0 BENIGN PROSTATIC HYPERPLASIA, UNSPECIFIED WHETHER LOWER URINARY TRACT SYMPTOMS PRESENT: ICD-10-CM

## 2024-08-01 RX ORDER — TAMSULOSIN HYDROCHLORIDE 0.4 MG/1
0.4 CAPSULE ORAL DAILY
Qty: 90 CAPSULE | Refills: 1 | Status: SHIPPED
Start: 2024-08-01 | End: 2024-08-02 | Stop reason: SDUPTHER

## 2024-08-01 RX ORDER — TAMSULOSIN HYDROCHLORIDE 0.4 MG/1
0.4 CAPSULE ORAL DAILY
Qty: 90 CAPSULE | Refills: 2 | Status: CANCELLED | OUTPATIENT
Start: 2024-08-01

## 2024-08-01 NOTE — TELEPHONE ENCOUNTER
Quentin called for a refill on     tamsulosin (Flomax) 0.4 mg 24 hr capsule [[id number]]  Take 1 capsule (0.4 mg) by mouth once daily.   LF 1/30/24  Patient is requesting 90 with refills for a year    LV 11/1/23  [place] ???    [facility]  [address] [place] [address]  [phone]

## 2024-08-02 ENCOUNTER — OFFICE VISIT (OUTPATIENT)
Dept: PRIMARY CARE | Facility: CLINIC | Age: 84
End: 2024-08-02
Payer: MEDICARE

## 2024-08-02 VITALS
DIASTOLIC BLOOD PRESSURE: 89 MMHG | HEIGHT: 68 IN | HEART RATE: 65 BPM | TEMPERATURE: 97.1 F | BODY MASS INDEX: 29.37 KG/M2 | SYSTOLIC BLOOD PRESSURE: 137 MMHG | WEIGHT: 193.8 LBS | OXYGEN SATURATION: 93 %

## 2024-08-02 DIAGNOSIS — F03.A0 MILD DEMENTIA WITHOUT BEHAVIORAL DISTURBANCE, PSYCHOTIC DISTURBANCE, MOOD DISTURBANCE, OR ANXIETY, UNSPECIFIED DEMENTIA TYPE (MULTI): ICD-10-CM

## 2024-08-02 DIAGNOSIS — N40.0 BENIGN PROSTATIC HYPERPLASIA, UNSPECIFIED WHETHER LOWER URINARY TRACT SYMPTOMS PRESENT: ICD-10-CM

## 2024-08-02 DIAGNOSIS — K51.90 ULCERATIVE COLITIS WITHOUT COMPLICATIONS, UNSPECIFIED LOCATION (MULTI): ICD-10-CM

## 2024-08-02 DIAGNOSIS — T14.8XXA INFECTED LACERATION: Primary | ICD-10-CM

## 2024-08-02 DIAGNOSIS — L08.9 INFECTED LACERATION: Primary | ICD-10-CM

## 2024-08-02 DIAGNOSIS — T14.8XXA PUNCTURE WOUND: ICD-10-CM

## 2024-08-02 DIAGNOSIS — I73.9 PVD (PERIPHERAL VASCULAR DISEASE) (CMS-HCC): ICD-10-CM

## 2024-08-02 DIAGNOSIS — S91.041A: ICD-10-CM

## 2024-08-02 PROCEDURE — 1158F ADVNC CARE PLAN TLK DOCD: CPT | Performed by: FAMILY MEDICINE

## 2024-08-02 PROCEDURE — 1123F ACP DISCUSS/DSCN MKR DOCD: CPT | Performed by: FAMILY MEDICINE

## 2024-08-02 PROCEDURE — 3075F SYST BP GE 130 - 139MM HG: CPT | Performed by: FAMILY MEDICINE

## 2024-08-02 PROCEDURE — 1159F MED LIST DOCD IN RCRD: CPT | Performed by: FAMILY MEDICINE

## 2024-08-02 PROCEDURE — 90471 IMMUNIZATION ADMIN: CPT | Performed by: FAMILY MEDICINE

## 2024-08-02 PROCEDURE — 90714 TD VACC NO PRESV 7 YRS+ IM: CPT | Performed by: FAMILY MEDICINE

## 2024-08-02 PROCEDURE — 1036F TOBACCO NON-USER: CPT | Performed by: FAMILY MEDICINE

## 2024-08-02 PROCEDURE — 3079F DIAST BP 80-89 MM HG: CPT | Performed by: FAMILY MEDICINE

## 2024-08-02 PROCEDURE — 99214 OFFICE O/P EST MOD 30 MIN: CPT | Performed by: FAMILY MEDICINE

## 2024-08-02 RX ORDER — MUPIROCIN 20 MG/G
OINTMENT TOPICAL 3 TIMES DAILY
Qty: 22 G | Refills: 0 | Status: SHIPPED | OUTPATIENT
Start: 2024-08-02 | End: 2024-08-12

## 2024-08-02 RX ORDER — CLINDAMYCIN HYDROCHLORIDE 300 MG/1
300 CAPSULE ORAL 4 TIMES DAILY
Qty: 40 CAPSULE | Refills: 0 | Status: SHIPPED | OUTPATIENT
Start: 2024-08-02 | End: 2024-08-12

## 2024-08-02 RX ORDER — TAMSULOSIN HYDROCHLORIDE 0.4 MG/1
0.4 CAPSULE ORAL DAILY
Qty: 90 CAPSULE | Refills: 3 | Status: SHIPPED | OUTPATIENT
Start: 2024-08-02

## 2024-08-02 NOTE — PROGRESS NOTES
Subjective   Patient ID: Quentin Henson is a 83 y.o. male who presents for Laceration (Wednesday was working outside and got cut on the leg by a piece of wood. Now the cut is painful around the area, red/Flomax did not go to right pharm).  Very pleasant 83-year-old with hypertension heart disease osteoarthritis and early dementia here today with puncture wound to his left leg he was outside working when a piece of wood went into he bumped into a piece of wood on the fence dug into his ankle he thought he pulled it all out but it has become red swollen tender and the redness is spreading he has no fever or systemic symptoms he is able to bear weight on the leg no calf tenderness but the swelling has increased and he noticed that the laceration was not healing very well and it seemed to be spreading a bit        Review of Systems  Constitutional: no chills, no fever and no night sweats.   Eyes: no blurred vision and no eyesight problems.   ENT: no hearing loss, no nasal congestion, no nasal discharge, no hoarseness and no sore throat.   Cardiovascular: no chest pain, no intermittent leg claudication, no lower extremity edema, no palpitations and no syncope.   Respiratory: no cough, no shortness of breath during exertion, no shortness of breath at rest and no wheezing.   Gastrointestinal: no abdominal pain, no blood in stools, no constipation, no diarrhea, no melena, no nausea, no rectal pain and no vomiting.   Genitourinary: no dysuria, no change in urinary frequency, no urinary hesitancy, no feelings of urinary urgency and no vaginal discharge.   Musculoskeletal: no arthralgias,  no back pain and no myalgias.   Integumentary: no new skin lesions and no rashes.   Neurological: no difficulty walking, no headache, no limb weakness, no numbness and no tingling.   Psychiatric: no anxiety, no depression, no anhedonia and no substance use disorders.   Endocrine: no recent weight gain and no recent weight loss.  "  Hematologic/Lymphatic: no tendency for easy bruising and no swollen glands .    Objective    /89   Pulse 65   Temp 36.2 °C (97.1 °F)   Ht 1.727 m (5' 8\")   Wt 87.9 kg (193 lb 12.8 oz)   SpO2 93%   BMI 29.47 kg/m²    Physical Exam  The patient appeared well nourished and normally developed. Vital signs as documented. Head exam is unremarkable. No scleral icterus or corneal arcus noted.  Pupils are equal round reactive to light extraocular movements are intact no hemorrhages noted on funduscopic exam mouth mucous membranes are moist no exudates ears canals clear TMs are gray pearly not injected nose no rhinorrhea or epistaxis Neck is without jugular venous distension, thyromegaly, or carotid bruits. Carotid upstrokes are brisk bilaterally. Lungs are clear to auscultation and percussion. Cardiac exam reveals the PMI to be normally sized and situated. Rhythm is regular. First and second heart sounds normal. No murmurs, rubs or gallops. Abdominal exam reveals normal bowel sounds, no masses, no organomegaly and no aortic enlargement. Extremities are nonedematous and both femoral and pedal pulses are normal.  Neurologic exam DTRs are equal bilaterally no focal deficits strength is symmetrical heme lymph no palpable lymph nodes in the neck axilla or groin degenerative changes in the joints erythematous area of cellulitis anterior tibia with central laceration    Assessment/Plan   Problem List Items Addressed This Visit       Infected laceration - Primary      Infected laceration with surrounding cellulitis due to a foreign body  Area incised and drained and ends at the end explored no evidence of foreign body  Clindamycin 4 mg  Topical Bactroban  Follow-up in 48 hours         Relevant Medications    clindamycin (Cleocin) 300 mg capsule    mupirocin (Bactroban) 2 % ointment     Other Visit Diagnoses       Benign prostatic hyperplasia, unspecified whether lower urinary tract symptoms present        Relevant " Medications    tamsulosin (Flomax) 0.4 mg 24 hr capsule    Puncture wound        Relevant Orders    Td vaccine, age 7 years and older (TENIVAC) (Completed)    Puncture wound with foreign body, right ankle, initial encounter        Relevant Orders    Td vaccine, age 7 years and older (TENIVAC) (Completed)                 Rachel Woodard MD

## 2024-08-12 PROBLEM — B96.89 ACUTE BACTERIAL SINUSITIS: Status: RESOLVED | Noted: 2024-08-12 | Resolved: 2024-08-12

## 2024-08-12 PROBLEM — N40.0 BENIGN PROSTATIC HYPERPLASIA: Status: RESOLVED | Noted: 2024-08-12 | Resolved: 2024-08-12

## 2024-08-12 PROBLEM — U07.1 DISEASE DUE TO SEVERE ACUTE RESPIRATORY SYNDROME CORONAVIRUS 2 (SARS-COV-2): Status: RESOLVED | Noted: 2024-08-12 | Resolved: 2024-08-12

## 2024-08-12 PROBLEM — G31.84 MILD COGNITIVE IMPAIRMENT WITH MEMORY LOSS: Status: RESOLVED | Noted: 2024-08-12 | Resolved: 2024-08-12

## 2024-08-12 PROBLEM — E66.9 OBESITY: Status: RESOLVED | Noted: 2024-08-12 | Resolved: 2024-08-12

## 2024-08-12 PROBLEM — T14.8XXA INFECTED LACERATION: Status: ACTIVE | Noted: 2024-08-12

## 2024-08-12 PROBLEM — E66.3 OVERWEIGHT WITH BODY MASS INDEX (BMI) 25.0-29.9: Status: RESOLVED | Noted: 2024-08-12 | Resolved: 2024-08-12

## 2024-08-12 PROBLEM — Z96.641 HISTORY OF TOTAL HIP ARTHROPLASTY, RIGHT: Status: ACTIVE | Noted: 2024-01-10

## 2024-08-12 PROBLEM — J01.90 ACUTE BACTERIAL SINUSITIS: Status: RESOLVED | Noted: 2024-08-12 | Resolved: 2024-08-12

## 2024-08-12 PROBLEM — L08.9 INFECTED LACERATION: Status: ACTIVE | Noted: 2024-08-12

## 2024-08-12 PROBLEM — R41.3 AMNESIA: Status: RESOLVED | Noted: 2024-08-12 | Resolved: 2024-08-12

## 2024-08-12 PROBLEM — R53.83 FATIGUE: Status: RESOLVED | Noted: 2024-08-12 | Resolved: 2024-08-12

## 2024-08-12 PROBLEM — R05.3 CHRONIC COUGH: Status: RESOLVED | Noted: 2024-08-12 | Resolved: 2024-08-12

## 2024-08-12 NOTE — ASSESSMENT & PLAN NOTE
Infected laceration with surrounding cellulitis due to a foreign body  Area incised and drained and ends at the end explored no evidence of foreign body  Clindamycin 4 mg  Topical Bactroban  Follow-up in 48 hours

## 2024-08-12 NOTE — ASSESSMENT & PLAN NOTE
Stable and controlled based on symptoms and exam colonoscopy up-to-date  Managed by gastroenterology

## 2024-08-12 NOTE — ASSESSMENT & PLAN NOTE
Stable based on symptoms and exam  No current issues  Managed by cardiology   negative External ear normal/No oral lesions/Normal oropharynx/Normal dentition/Extra occular movements intact/PERRLA/Anicteric conjunctivae/Normal tympanic membranes

## 2024-08-30 ENCOUNTER — TELEPHONE (OUTPATIENT)
Dept: PRIMARY CARE | Facility: CLINIC | Age: 84
End: 2024-08-30
Payer: MEDICARE

## 2024-08-30 NOTE — TELEPHONE ENCOUNTER
Pt said he is getting charged for a tetanus shot he received on 8/2/24 from his office visit with us. He said he already spoke with  billing. I could not find any discrepancies with his insurance card. Please advise.

## 2025-01-17 ENCOUNTER — OFFICE VISIT (OUTPATIENT)
Dept: PRIMARY CARE | Facility: CLINIC | Age: 85
End: 2025-01-17
Payer: MEDICARE

## 2025-01-17 VITALS
WEIGHT: 195.2 LBS | TEMPERATURE: 97.3 F | BODY MASS INDEX: 29.58 KG/M2 | DIASTOLIC BLOOD PRESSURE: 56 MMHG | SYSTOLIC BLOOD PRESSURE: 89 MMHG | OXYGEN SATURATION: 98 % | HEIGHT: 68 IN | HEART RATE: 85 BPM

## 2025-01-17 DIAGNOSIS — B02.9 HERPES ZOSTER WITHOUT COMPLICATION: Primary | ICD-10-CM

## 2025-01-17 PROCEDURE — 3074F SYST BP LT 130 MM HG: CPT | Performed by: NURSE PRACTITIONER

## 2025-01-17 PROCEDURE — 1160F RVW MEDS BY RX/DR IN RCRD: CPT | Performed by: NURSE PRACTITIONER

## 2025-01-17 PROCEDURE — 1123F ACP DISCUSS/DSCN MKR DOCD: CPT | Performed by: NURSE PRACTITIONER

## 2025-01-17 PROCEDURE — 99213 OFFICE O/P EST LOW 20 MIN: CPT | Performed by: NURSE PRACTITIONER

## 2025-01-17 PROCEDURE — 1036F TOBACCO NON-USER: CPT | Performed by: NURSE PRACTITIONER

## 2025-01-17 PROCEDURE — 3078F DIAST BP <80 MM HG: CPT | Performed by: NURSE PRACTITIONER

## 2025-01-17 PROCEDURE — 1159F MED LIST DOCD IN RCRD: CPT | Performed by: NURSE PRACTITIONER

## 2025-01-17 RX ORDER — VALACYCLOVIR HYDROCHLORIDE 1 G/1
1000 TABLET, FILM COATED ORAL 3 TIMES DAILY
Qty: 21 TABLET | Refills: 0 | Status: SHIPPED | OUTPATIENT
Start: 2025-01-17 | End: 2025-01-24

## 2025-01-17 ASSESSMENT — ENCOUNTER SYMPTOMS
FEVER: 0
CHILLS: 1
FATIGUE: 0

## 2025-01-17 NOTE — PROGRESS NOTES
Subjective   Patient ID: Quentin Henson is a 84 y.o. male who presents for Rash.  Last physical: due    Here for rash    HPI  Rash on rt chest and around to back yesterday. Painful in this area x2d    Having surgery on tuesday    No care team member to display     Review of Systems   Constitutional:  Positive for chills. Negative for fatigue and fever.   Skin:  Positive for rash.       Objective   Visit Vitals  BP 89/56   Pulse 85   Temp 36.3 °C (97.3 °F)      BP Readings from Last 3 Encounters:   01/17/25 89/56   08/02/24 137/89   04/04/24 (!) 149/96     Wt Readings from Last 3 Encounters:   01/17/25 88.5 kg (195 lb 3.2 oz)   08/02/24 87.9 kg (193 lb 12.8 oz)   04/04/24 86.6 kg (191 lb)       Physical Exam  Skin:     Findings: Rash (blistery rash on red base front of rt chest to under rt armpit around to back; no redness incr around the area. serous drainage noted from the open blisters) present.         Assessment/Plan   Diagnoses and all orders for this visit:  Herpes zoster without complication  -     valACYclovir (Valtrex) 1 gram tablet; Take 1 tablet (1,000 mg) by mouth 3 times a day for 7 days.  Other orders  -     Follow Up In Primary Care - Medicare Annual; Future        See patient instructions for full plan

## 2025-01-17 NOTE — PATIENT INSTRUCTIONS
You have shingles.  You can't give shingles to anyone.  You can give chicken pox to those who have not had chicken pox (mostly those under age 1). You are not contagious once the blisters scab.  Neosporin on the open areas-otc  Rx-valtex-anti-viral  Call if redness increasing into chest or abdomen or down or up back or if you have a fever.    Can get shingles vaccine in a month once the rash is cleared up and no pain    Call if pain worsens     I will communicate with you via [employer] regarding messages and results. If you need help with this, you can call the support line at [phone].    IT WAS A PLEASURE TO SEE YOU TODAY. THANK YOU FOR CHOOSING US FOR YOUR HEALTHCARE NEEDS.

## 2025-01-24 ENCOUNTER — OFFICE VISIT (OUTPATIENT)
Dept: PRIMARY CARE | Facility: CLINIC | Age: 85
End: 2025-01-24
Payer: MEDICARE

## 2025-01-24 VITALS
WEIGHT: 195 LBS | BODY MASS INDEX: 29.55 KG/M2 | HEART RATE: 90 BPM | OXYGEN SATURATION: 97 % | DIASTOLIC BLOOD PRESSURE: 83 MMHG | HEIGHT: 68 IN | SYSTOLIC BLOOD PRESSURE: 127 MMHG | TEMPERATURE: 97.3 F

## 2025-01-24 DIAGNOSIS — B02.9 HERPES ZOSTER WITHOUT COMPLICATION: Primary | ICD-10-CM

## 2025-01-24 DIAGNOSIS — F03.A0 MILD DEMENTIA WITHOUT BEHAVIORAL DISTURBANCE, PSYCHOTIC DISTURBANCE, MOOD DISTURBANCE, OR ANXIETY, UNSPECIFIED DEMENTIA TYPE: ICD-10-CM

## 2025-01-24 DIAGNOSIS — K51.90 ULCERATIVE COLITIS WITHOUT COMPLICATIONS, UNSPECIFIED LOCATION (MULTI): ICD-10-CM

## 2025-01-24 PROCEDURE — 1159F MED LIST DOCD IN RCRD: CPT | Performed by: FAMILY MEDICINE

## 2025-01-24 PROCEDURE — 1160F RVW MEDS BY RX/DR IN RCRD: CPT | Performed by: FAMILY MEDICINE

## 2025-01-24 PROCEDURE — 1158F ADVNC CARE PLAN TLK DOCD: CPT | Performed by: FAMILY MEDICINE

## 2025-01-24 PROCEDURE — 99213 OFFICE O/P EST LOW 20 MIN: CPT | Performed by: FAMILY MEDICINE

## 2025-01-24 PROCEDURE — 3074F SYST BP LT 130 MM HG: CPT | Performed by: FAMILY MEDICINE

## 2025-01-24 PROCEDURE — 1170F FXNL STATUS ASSESSED: CPT | Performed by: FAMILY MEDICINE

## 2025-01-24 PROCEDURE — 3079F DIAST BP 80-89 MM HG: CPT | Performed by: FAMILY MEDICINE

## 2025-01-24 PROCEDURE — 1123F ACP DISCUSS/DSCN MKR DOCD: CPT | Performed by: FAMILY MEDICINE

## 2025-01-24 PROCEDURE — 1036F TOBACCO NON-USER: CPT | Performed by: FAMILY MEDICINE

## 2025-01-24 RX ORDER — TRIAMCINOLONE ACETONIDE 1 MG/G
CREAM TOPICAL 2 TIMES DAILY
Qty: 30 G | Refills: 0 | Status: SHIPPED | OUTPATIENT
Start: 2025-01-24

## 2025-01-24 ASSESSMENT — ACTIVITIES OF DAILY LIVING (ADL)
DRESSING: INDEPENDENT
DOING_HOUSEWORK: INDEPENDENT
TAKING_MEDICATION: INDEPENDENT
MANAGING_FINANCES: INDEPENDENT
BATHING: INDEPENDENT
GROCERY_SHOPPING: INDEPENDENT

## 2025-01-24 ASSESSMENT — PATIENT HEALTH QUESTIONNAIRE - PHQ9
SUM OF ALL RESPONSES TO PHQ9 QUESTIONS 1 AND 2: 0
1. LITTLE INTEREST OR PLEASURE IN DOING THINGS: NOT AT ALL
2. FEELING DOWN, DEPRESSED OR HOPELESS: NOT AT ALL
SUM OF ALL RESPONSES TO PHQ9 QUESTIONS 1 AND 2: 0
1. LITTLE INTEREST OR PLEASURE IN DOING THINGS: NOT AT ALL
2. FEELING DOWN, DEPRESSED OR HOPELESS: NOT AT ALL

## 2025-01-24 NOTE — PROGRESS NOTES
"Subjective   Patient ID: Quentin Henson is a 84 y.o. male who presents for Rash (Follow up from shingles ).  HPI    Review of Systems    Objective    /83   Pulse 90   Temp 36.3 °C (97.3 °F)   Ht 1.727 m (5' 8\")   Wt 88.5 kg (195 lb)   SpO2 97%   BMI 29.65 kg/m²    Physical Exam    Assessment/Plan   Problem List Items Addressed This Visit    None           Rachel Woodard MD    " Carotid upstrokes are brisk bilaterally. Lungs are clear to auscultation and percussion. Cardiac exam reveals the PMI to be normally sized and situated. Rhythm is regular. First and second heart sounds normal. No murmurs, rubs or gallops. Abdominal exam reveals normal bowel sounds, no masses, no organomegaly and no aortic enlargement. Extremities are nonedematous and both femoral and pedal pulses are normal.  Neurologic exam DTRs are equal bilaterally no focal deficits strength is symmetrical heme lymph no palpable lymph nodes in the neck axilla or groin    Assessment/Plan   Problem List Items Addressed This Visit       Ulcerative colitis    Mild dementia    Herpes zoster without complication - Primary    Relevant Medications    triamcinolone (Kenalog) 0.1 % cream            Rachel Woodard MD

## 2025-02-01 PROBLEM — B02.9 HERPES ZOSTER WITHOUT COMPLICATION: Status: ACTIVE | Noted: 2025-02-01

## 2025-03-20 LAB
NON-UH HIE CALCULATED LDL CHOLESTEROL: 53 MG/DL (ref 60–130)
NON-UH HIE CHOLESTEROL: 116 MG/DL (ref 100–200)
NON-UH HIE HDL CHOLESTEROL: 49 MG/DL (ref 40–60)
NON-UH HIE TOTAL CHOL/HDL CHOL RATIO: 2.4
NON-UH HIE TRIGLYCERIDES: 69 MG/DL (ref 30–150)

## 2025-04-01 ENCOUNTER — TELEPHONE (OUTPATIENT)
Dept: PRIMARY CARE | Facility: CLINIC | Age: 85
End: 2025-04-01
Payer: MEDICARE

## 2025-04-01 NOTE — TELEPHONE ENCOUNTER
Quentin called to say that he went to Dr Smith and was diagnosed with Afib. He wanted Dr Woodard to know.

## 2025-04-07 LAB
NON-UH HIE BUN/CREAT RATIO: 20
NON-UH HIE BUN: 20 MG/DL (ref 9–23)
NON-UH HIE CALCIUM: 9.5 MG/DL (ref 8.7–10.4)
NON-UH HIE CALCULATED LDL CHOLESTEROL: 52 MG/DL (ref 60–130)
NON-UH HIE CALCULATED OSMOLALITY: 285 MOSM/KG (ref 275–295)
NON-UH HIE CHLORIDE: 106 MMOL/L (ref 98–107)
NON-UH HIE CHOLESTEROL: 121 MG/DL (ref 100–200)
NON-UH HIE CO2, VENOUS: 26 MMOL/L (ref 20–31)
NON-UH HIE CREATININE: 1 MG/DL (ref 0.6–1.1)
NON-UH HIE FREE T4: 1.49 NG/DL (ref 0.89–1.76)
NON-UH HIE GFR AA: >60
NON-UH HIE GLOMERULAR FILTRATION RATE: >60 ML/MIN/1.73M?
NON-UH HIE GLUCOSE: 110 MG/DL (ref 74–106)
NON-UH HIE HDL CHOLESTEROL: 44 MG/DL (ref 40–60)
NON-UH HIE HGB A1C: 5.9 %
NON-UH HIE K: 4.2 MMOL/L (ref 3.5–5.1)
NON-UH HIE NA: 141 MMOL/L (ref 135–145)
NON-UH HIE TOTAL CHOL/HDL CHOL RATIO: 2.8
NON-UH HIE TRIGLYCERIDES: 123 MG/DL (ref 30–150)
NON-UH HIE TSH: 1.93 UIU/ML (ref 0.55–4.78)

## 2025-04-21 ENCOUNTER — APPOINTMENT (OUTPATIENT)
Dept: PRIMARY CARE | Facility: CLINIC | Age: 85
End: 2025-04-21
Payer: MEDICARE

## 2025-08-18 ENCOUNTER — TELEPHONE (OUTPATIENT)
Dept: PRIMARY CARE | Facility: CLINIC | Age: 85
End: 2025-08-18
Payer: MEDICARE

## 2025-08-18 DIAGNOSIS — N40.0 BENIGN PROSTATIC HYPERPLASIA, UNSPECIFIED WHETHER LOWER URINARY TRACT SYMPTOMS PRESENT: ICD-10-CM

## 2025-08-18 RX ORDER — TAMSULOSIN HYDROCHLORIDE 0.4 MG/1
0.4 CAPSULE ORAL DAILY
Qty: 90 CAPSULE | Refills: 3 | Status: SHIPPED | OUTPATIENT
Start: 2025-08-18

## 2025-12-30 ENCOUNTER — APPOINTMENT (OUTPATIENT)
Dept: PRIMARY CARE | Facility: CLINIC | Age: 85
End: 2025-12-30
Payer: MEDICARE